# Patient Record
Sex: MALE | URBAN - METROPOLITAN AREA
[De-identification: names, ages, dates, MRNs, and addresses within clinical notes are randomized per-mention and may not be internally consistent; named-entity substitution may affect disease eponyms.]

---

## 2024-05-17 ENCOUNTER — APPOINTMENT (OUTPATIENT)
Dept: URGENT CARE | Facility: CLINIC | Age: 19
End: 2024-05-17

## 2024-09-10 ENCOUNTER — OFFICE VISIT (OUTPATIENT)
Dept: URGENT CARE | Facility: CLINIC | Age: 19
End: 2024-09-10
Payer: COMMERCIAL

## 2024-09-10 VITALS
SYSTOLIC BLOOD PRESSURE: 92 MMHG | TEMPERATURE: 99.1 F | OXYGEN SATURATION: 99 % | HEIGHT: 70 IN | BODY MASS INDEX: 21.05 KG/M2 | DIASTOLIC BLOOD PRESSURE: 60 MMHG | HEART RATE: 74 BPM | RESPIRATION RATE: 12 BRPM | WEIGHT: 147 LBS

## 2024-09-10 DIAGNOSIS — R09.81 NASAL CONGESTION: ICD-10-CM

## 2024-09-10 DIAGNOSIS — H66.93 ACUTE BILATERAL OTITIS MEDIA: ICD-10-CM

## 2024-09-10 PROCEDURE — 99204 OFFICE O/P NEW MOD 45 MIN: CPT

## 2024-09-10 PROCEDURE — 3074F SYST BP LT 130 MM HG: CPT

## 2024-09-10 PROCEDURE — 3078F DIAST BP <80 MM HG: CPT

## 2024-09-10 RX ORDER — FLUTICASONE PROPIONATE 50 MCG
1 SPRAY, SUSPENSION (ML) NASAL DAILY
Qty: 16 G | Refills: 0 | Status: SHIPPED | OUTPATIENT
Start: 2024-09-10

## 2024-09-10 ASSESSMENT — ENCOUNTER SYMPTOMS
FEVER: 0
CHILLS: 0

## 2024-09-10 NOTE — LETTER
September 10, 2024    To Whom It May Concern:         This is confirmation that Ryder Levi Hoenig attended his scheduled appointment with LEXIS Cox on 9/10/24. Please excuse from work on 9/10 and 9/11, Thank you for making accommodations for rest and recovery.          If you have any questions please do not hesitate to call me at the phone number listed below.    Sincerely,          CARLOS CoxRMicaelaN.  [phone]

## 2024-10-19 ENCOUNTER — APPOINTMENT (OUTPATIENT)
Dept: RADIOLOGY | Facility: IMAGING CENTER | Age: 19
End: 2024-10-19
Payer: COMMERCIAL

## 2024-10-19 ENCOUNTER — OFFICE VISIT (OUTPATIENT)
Dept: URGENT CARE | Facility: CLINIC | Age: 19
End: 2024-10-19
Payer: COMMERCIAL

## 2024-10-19 VITALS
OXYGEN SATURATION: 96 % | WEIGHT: 149.7 LBS | HEART RATE: 70 BPM | RESPIRATION RATE: 12 BRPM | TEMPERATURE: 97.4 F | BODY MASS INDEX: 21.43 KG/M2 | DIASTOLIC BLOOD PRESSURE: 64 MMHG | HEIGHT: 70 IN | SYSTOLIC BLOOD PRESSURE: 122 MMHG

## 2024-10-19 DIAGNOSIS — S62.355A CLOSED NONDISPLACED FRACTURE OF SHAFT OF FOURTH METACARPAL BONE OF LEFT HAND, INITIAL ENCOUNTER: ICD-10-CM

## 2024-10-19 PROCEDURE — 3078F DIAST BP <80 MM HG: CPT

## 2024-10-19 PROCEDURE — 73130 X-RAY EXAM OF HAND: CPT | Mod: TC,LT | Performed by: RADIOLOGY

## 2024-10-19 PROCEDURE — 3074F SYST BP LT 130 MM HG: CPT

## 2024-10-19 PROCEDURE — 99213 OFFICE O/P EST LOW 20 MIN: CPT | Mod: 25

## 2024-10-19 PROCEDURE — 29125 APPL SHORT ARM SPLINT STATIC: CPT

## 2024-10-19 RX ORDER — KETOROLAC TROMETHAMINE 15 MG/ML
15 INJECTION, SOLUTION INTRAMUSCULAR; INTRAVENOUS ONCE
Status: COMPLETED | OUTPATIENT
Start: 2024-10-19 | End: 2024-10-19

## 2024-10-19 RX ADMIN — KETOROLAC TROMETHAMINE 15 MG: 15 INJECTION, SOLUTION INTRAMUSCULAR; INTRAVENOUS at 11:34

## 2024-10-19 ASSESSMENT — ENCOUNTER SYMPTOMS
COUGH: 0
VOMITING: 0
NAUSEA: 0
SORE THROAT: 0
FALLS: 0
MYALGIAS: 0
DIARRHEA: 0
CHILLS: 0
ABDOMINAL PAIN: 0
FEVER: 0
HEADACHES: 0
SHORTNESS OF BREATH: 0

## 2025-01-06 ENCOUNTER — PHARMACY VISIT (OUTPATIENT)
Dept: PHARMACY | Facility: MEDICAL CENTER | Age: 20
End: 2025-01-06
Payer: COMMERCIAL

## 2025-01-06 ENCOUNTER — HOSPITAL ENCOUNTER (EMERGENCY)
Facility: MEDICAL CENTER | Age: 20
End: 2025-01-06
Attending: EMERGENCY MEDICINE
Payer: COMMERCIAL

## 2025-01-06 VITALS
HEIGHT: 70 IN | OXYGEN SATURATION: 96 % | DIASTOLIC BLOOD PRESSURE: 62 MMHG | RESPIRATION RATE: 14 BRPM | TEMPERATURE: 98.5 F | SYSTOLIC BLOOD PRESSURE: 111 MMHG | WEIGHT: 150.13 LBS | HEART RATE: 71 BPM | BODY MASS INDEX: 21.49 KG/M2

## 2025-01-06 DIAGNOSIS — H66.92 LEFT ACUTE OTITIS MEDIA: ICD-10-CM

## 2025-01-06 DIAGNOSIS — B34.9 VIRAL SYNDROME: ICD-10-CM

## 2025-01-06 PROCEDURE — RXMED WILLOW AMBULATORY MEDICATION CHARGE: Performed by: EMERGENCY MEDICINE

## 2025-01-06 PROCEDURE — 700102 HCHG RX REV CODE 250 W/ 637 OVERRIDE(OP): Performed by: EMERGENCY MEDICINE

## 2025-01-06 PROCEDURE — 99283 EMERGENCY DEPT VISIT LOW MDM: CPT

## 2025-01-06 PROCEDURE — A9270 NON-COVERED ITEM OR SERVICE: HCPCS | Performed by: EMERGENCY MEDICINE

## 2025-01-06 RX ORDER — AMOXICILLIN 500 MG/1
500 CAPSULE ORAL ONCE
Status: DISCONTINUED | OUTPATIENT
Start: 2025-01-06 | End: 2025-01-06

## 2025-01-06 RX ORDER — AMOXICILLIN 500 MG/1
1000 CAPSULE ORAL ONCE
Status: COMPLETED | OUTPATIENT
Start: 2025-01-06 | End: 2025-01-06

## 2025-01-06 RX ORDER — AMOXICILLIN 500 MG/1
1000 CAPSULE ORAL EVERY 12 HOURS
Qty: 28 CAPSULE | Refills: 0 | Status: ACTIVE | OUTPATIENT
Start: 2025-01-06 | End: 2025-01-13

## 2025-01-06 RX ADMIN — AMOXICILLIN 1000 MG: 500 CAPSULE ORAL at 20:50

## 2025-01-06 ASSESSMENT — PAIN DESCRIPTION - PAIN TYPE: TYPE: ACUTE PAIN

## 2025-01-07 NOTE — ED PROVIDER NOTES
ED Provider Note    Scribed for Nabeel Joiner M.D. by Héctor Bernardo. 1/6/2025  8:19 PM    Primary care provider: Pcp Pt States None  Means of arrival: Walk-in    CHIEF COMPLAINT  Chief Complaint   Patient presents with    Earache     Pt reports ear pain x1 week.  Pt feels like his ear is clogged, and it seems to be getting worse despite home interventions.         EXTERNAL RECORDS REVIEWED  [facility] visit from November 14 reviewed for left hand pain.  He had a left fourth metacarpal fracture.    HPI    Ryder Levi Hoenig is a 19 y.o. male who presents to the Emergency Department for evaluation of left ear pain, onset 1 week ago. The patient reports a history of ear infections, noting his current pain feels very similar to prior pain. He also reports upper left dental pain, noting he has a fractured tooth. He reports left-sided sinus pain. He denies dental pain currently. He denies shortness of breath or chest pain. He denies receiving the flu or Covid vaccine this year. He denies a medical history of diabetes or asthma.     LIMITATION TO HISTORY   None    OUTSIDE HISTORIAN(S):  None    PAST MEDICAL HISTORY  History reviewed. He denies a history of asthma or diabetes.     FAMILY HISTORY  No family history noted.     SOCIAL HISTORY  Social History     Tobacco Use    Smoking status: Every Day     Types: Cigarettes    Smokeless tobacco: Current   Substance Use Topics    Alcohol use: Never    Drug use: Never     Social History     Substance and Sexual Activity   Drug Use Never       SURGICAL HISTORY  History reviewed. No pertinent surgical history.    CURRENT MEDICATIONS  No current facility-administered medications for this encounter.    Current Outpatient Medications:     amoxicillin (AMOXIL) 500 MG Cap, Take 2 Capsules by mouth every 12 hours for 7 days., Disp: 28 Capsule, Rfl: 0    fluticasone (FLONASE) 50 MCG/ACT nasal spray, Administer 1 Spray into affected nostril(S) every day., Disp: 16 g, Rfl: 0    ALLERGIES  No Known  "Allergies    PHYSICAL EXAM  VITAL SIGNS: /60   Pulse (!) 59   Temp 37.2 °C (98.9 °F) (Temporal)   Resp 20   Ht 1.778 m (5' 10\")   Wt 68.1 kg (150 lb 2.1 oz)   SpO2 98%   BMI 21.54 kg/m²   Reviewed and afebrile  Constitutional: Well developed, Well nourished.  HENT: Normocephalic, atraumatic, bilateral external ears normal, No intraoral erythema, edema, exudate. Bulging TM with pus behind TM. Some erythema on tympanic membrane itself. Oropharynx normal. No pointing abscess to gingiva,   Eyes: PERRLA, conjunctiva pink, no scleral icterus.   Cardiovascular: Regular rate and rhythm. No murmurs, rubs or gallops.  No dependent edema or calf tenderness  Respiratory: Lungs clear to auscultation bilaterally. No wheezes, rales, or rhonchi.  Abdominal:  Abdomen soft, non-tender, non distended. No rebound, or guarding.    Skin: No erythema, no rash. No wounds or bruising.  Genitourinary: No costovertebral angle tenderness.   Musculoskeletal: no deformities.   Neurologic: Alert & oriented x 3, cranial nerves 2-12 intact by passive exam.  No focal deficit noted.  Psychiatric: Affect normal, Judgment normal, Mood normal.     LABS Ordered and Reviewed by Me:  No results found for this or any previous visit.    Interpretations:    Pulse Oximetry: Normal oxygenation on RA.       COURSE & MEDICAL DECISION MAKING  8:19 PM - Patient seen and examined at bedside. I discussed with the patient his symptoms and physical exam are consistent with otitis media of the left ear. I informed the patient I am prescribing him antibiotics and to take them as prescribed starting tomorrow. He will receive his first dose of amoxicillin here prior to discharge. I advised the patient to take Ibuprofen 600 mg 3x a day and add Tylenol 650 mg as needed for persistent pain or fever. I advised the patient to see a doctor in 3 days if not improving. Patient was given the opportunity for questions. I addressed all questions or concerns at this time " and they verbalize agreement to the plan of care.     INTERVENTIONS BY ME:  Medications   amoxicillin (Amoxil) capsule 1,000 mg (1,000 mg Oral Given 1/6/25 2050)         ASSESSMENT, COURSE AND PLAN:  PROBLEMS EVALUATED THIS VISIT:    This patient presents with a viral syndrome, likely influenza given exposure, and left ear pain and has suppurative otitis media without evidence of mastoiditis nor otitis externa on clinical exam.        DISPOSITION AND DISCUSSIONS    RISK:  Moderate given need for prescription medication management    PLAN:  Discharge Medication List as of 1/6/2025  8:45 PM        START taking these medications    Details   amoxicillin (AMOXIL) 500 MG Cap Take 2 Tablets by mouth every 12 hours for 7 days., Disp-28 Tablet, R-0, Normal             Ibuprofen, acetaminofen    Otitis media handout given      Followup:  [facility]  [address] [address]  [phone]  Schedule an appointment as soon as possible for a visit   for a new primary      CONDITION: Fair.     FINAL IMPRESSION  1. Left acute otitis media    2. Viral syndrome         Héctor POSEY (Cholo), am scribing for, and in the presence of, Nabeel Joiner M.D.    Electronically signed by: Héctor Bernardo (Cholo), 1/6/2025    Nabeel POSEY M.D. personally performed the services described in this documentation, as scribed by Héctor Bernardo in my presence, and it is both accurate and complete.      The note accurately reflects work and decisions made by me.  Nabeel Joiner M.D.  1/7/2025  9:27 AM
